# Patient Record
Sex: FEMALE
[De-identification: names, ages, dates, MRNs, and addresses within clinical notes are randomized per-mention and may not be internally consistent; named-entity substitution may affect disease eponyms.]

---

## 2023-11-21 ENCOUNTER — DASHBOARD ENCOUNTERS (OUTPATIENT)
Age: 53
End: 2023-11-21

## 2023-11-21 ENCOUNTER — OFFICE VISIT (OUTPATIENT)
Dept: URBAN - METROPOLITAN AREA CLINIC 72 | Facility: CLINIC | Age: 53
End: 2023-11-21
Payer: COMMERCIAL

## 2023-11-21 VITALS
TEMPERATURE: 96.8 F | WEIGHT: 125 LBS | SYSTOLIC BLOOD PRESSURE: 102 MMHG | DIASTOLIC BLOOD PRESSURE: 67 MMHG | HEART RATE: 62 BPM

## 2023-11-21 DIAGNOSIS — Z09 S/P GASTROSTOMY TUBE (G TUBE) PLACEMENT, FOLLOW-UP EXAM: ICD-10-CM

## 2023-11-21 PROCEDURE — 99203 OFFICE O/P NEW LOW 30 MIN: CPT | Performed by: INTERNAL MEDICINE

## 2023-11-21 RX ORDER — LEVETIRACETAM 100 MG/ML
5 ML SOLUTION ORAL
Status: ACTIVE | COMMUNITY

## 2023-11-21 RX ORDER — AMLODIPINE BESYLATE 2.5 MG/1
1 TABLET TABLET ORAL ONCE A DAY
Status: ACTIVE | COMMUNITY

## 2023-11-21 RX ORDER — ESCITALOPRAM OXALATE 20 MG/1
1 TABLET TABLET ORAL ONCE A DAY
Status: ACTIVE | COMMUNITY

## 2023-11-21 RX ORDER — BACLOFEN 5 MG/1
1 TABLET AS NEEDED TABLET ORAL ONCE A DAY
Status: ACTIVE | COMMUNITY

## 2023-11-21 RX ORDER — AMANTADINE HYDROCHLORIDE 100 MG/1
1 CAPSULE CAPSULE, GELATIN COATED ORAL ONCE A DAY
Status: ACTIVE | COMMUNITY

## 2023-11-21 RX ORDER — BACILLUS COAGULANS 1B CELL
AS DIRECTED CAPSULE ORAL
Status: ACTIVE | COMMUNITY

## 2023-11-21 RX ORDER — FAMOTIDINE 20 MG/1
1 TABLET AT BEDTIME AS NEEDED TABLET, FILM COATED ORAL ONCE A DAY
Status: ACTIVE | COMMUNITY

## 2023-11-21 RX ORDER — CARVEDILOL 25 MG/1
1 TABLET WITH FOOD TABLET, FILM COATED ORAL TWICE A DAY
Status: ACTIVE | COMMUNITY

## 2023-11-21 RX ORDER — ATORVASTATIN CALCIUM 40 MG/1
1 TABLET TABLET, FILM COATED ORAL ONCE A DAY
Status: ACTIVE | COMMUNITY

## 2023-11-21 NOTE — HPI-TODAY'S VISIT:
53-year-old female new to the clinic referred by Dr. Jasso to establish care with history of a G- tube. A copy of this note will be sent to the referring provider.   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    She is here with her spouse and daughter.  Had a G-Tube place last year, replaced in January this year at [facility].  Works well currently. No signs of infection.  Able to swallow pureed food.  On Jevity 4 times a day.  Has gained weight since it was placed.  No melena or hematochezia.  No abdominal pain or other GI concerns.  They would like to be establish in case something goes wrong with the tube.

## 2023-11-21 NOTE — HPI-OTHER HISTORIES
-Stool test 1/30/2023. C. difficile negative. -Labs 10/25/2022. BMP: Glucose 120. CBC: Hgb 10.4, HCT 31, RBC 3.42. Hemoglobin A1c 5.7.

## 2025-01-28 ENCOUNTER — NEW PATIENT (OUTPATIENT)
Age: 55
End: 2025-01-28

## 2025-01-28 DIAGNOSIS — H10.30: ICD-10-CM

## 2025-01-28 DIAGNOSIS — H00.031: ICD-10-CM

## 2025-01-28 PROCEDURE — 99211NC NO CHARGE VISIT: Mod: NC
